# Patient Record
Sex: MALE | Race: WHITE | Employment: FULL TIME | URBAN - METROPOLITAN AREA
[De-identification: names, ages, dates, MRNs, and addresses within clinical notes are randomized per-mention and may not be internally consistent; named-entity substitution may affect disease eponyms.]

---

## 2022-06-06 ENCOUNTER — OFFICE VISIT (OUTPATIENT)
Dept: FAMILY MEDICINE CLINIC | Age: 37
End: 2022-06-06
Payer: COMMERCIAL

## 2022-06-06 VITALS
SYSTOLIC BLOOD PRESSURE: 102 MMHG | HEIGHT: 73 IN | BODY MASS INDEX: 25.71 KG/M2 | HEART RATE: 82 BPM | DIASTOLIC BLOOD PRESSURE: 76 MMHG | OXYGEN SATURATION: 98 % | WEIGHT: 194 LBS

## 2022-06-06 DIAGNOSIS — F90.0 ATTENTION DEFICIT HYPERACTIVITY DISORDER (ADHD), PREDOMINANTLY INATTENTIVE TYPE: Primary | ICD-10-CM

## 2022-06-06 PROBLEM — M25.562 BILATERAL CHRONIC KNEE PAIN: Status: ACTIVE | Noted: 2018-01-12

## 2022-06-06 PROBLEM — M75.102 ROTATOR CUFF SYNDROME, LEFT: Status: ACTIVE | Noted: 2018-01-12

## 2022-06-06 PROBLEM — M76.51 PATELLAR TENDINITIS OF BOTH KNEES: Status: ACTIVE | Noted: 2018-01-12

## 2022-06-06 PROBLEM — F33.2 SEVERE EPISODE OF RECURRENT MAJOR DEPRESSIVE DISORDER, WITHOUT PSYCHOTIC FEATURES (HCC): Status: ACTIVE | Noted: 2018-01-26

## 2022-06-06 PROBLEM — G44.209 TENSION TYPE HEADACHE: Status: ACTIVE | Noted: 2018-01-12

## 2022-06-06 PROBLEM — M25.511 CHRONIC PAIN OF BOTH SHOULDERS: Status: ACTIVE | Noted: 2018-01-12

## 2022-06-06 PROBLEM — G89.29 BILATERAL CHRONIC KNEE PAIN: Status: ACTIVE | Noted: 2018-01-12

## 2022-06-06 PROBLEM — M76.52 PATELLAR TENDINITIS OF BOTH KNEES: Status: ACTIVE | Noted: 2018-01-12

## 2022-06-06 PROBLEM — M25.561 BILATERAL CHRONIC KNEE PAIN: Status: ACTIVE | Noted: 2018-01-12

## 2022-06-06 PROBLEM — G89.29 CHRONIC PAIN OF BOTH SHOULDERS: Status: ACTIVE | Noted: 2018-01-12

## 2022-06-06 PROBLEM — M25.512 CHRONIC PAIN OF BOTH SHOULDERS: Status: ACTIVE | Noted: 2018-01-12

## 2022-06-06 PROBLEM — D56.3 THALASSEMIA MINOR: Status: ACTIVE | Noted: 2018-01-12

## 2022-06-06 PROCEDURE — G8427 DOCREV CUR MEDS BY ELIG CLIN: HCPCS | Performed by: FAMILY MEDICINE

## 2022-06-06 PROCEDURE — 99203 OFFICE O/P NEW LOW 30 MIN: CPT | Performed by: FAMILY MEDICINE

## 2022-06-06 PROCEDURE — G8419 CALC BMI OUT NRM PARAM NOF/U: HCPCS | Performed by: FAMILY MEDICINE

## 2022-06-06 PROCEDURE — 1036F TOBACCO NON-USER: CPT | Performed by: FAMILY MEDICINE

## 2022-06-06 RX ORDER — GUANFACINE 1 MG/1
TABLET, EXTENDED RELEASE ORAL
Qty: 70 TABLET | Refills: 0 | Status: SHIPPED | OUTPATIENT
Start: 2022-06-06

## 2022-06-06 RX ORDER — DEXTROAMPHETAMINE SACCHARATE, AMPHETAMINE ASPARTATE, DEXTROAMPHETAMINE SULFATE AND AMPHETAMINE SULFATE 5; 5; 5; 5 MG/1; MG/1; MG/1; MG/1
20 TABLET ORAL 3 TIMES DAILY
Qty: 90 TABLET | Refills: 0 | Status: SHIPPED | OUTPATIENT
Start: 2022-06-06 | End: 2022-07-11 | Stop reason: SDUPTHER

## 2022-06-06 RX ORDER — DEXTROAMPHETAMINE SACCHARATE, AMPHETAMINE ASPARTATE MONOHYDRATE, DEXTROAMPHETAMINE SULFATE AND AMPHETAMINE SULFATE 2.5; 2.5; 2.5; 2.5 MG/1; MG/1; MG/1; MG/1
10 CAPSULE, EXTENDED RELEASE ORAL 3 TIMES DAILY
COMMUNITY
End: 2022-06-06

## 2022-06-06 SDOH — ECONOMIC STABILITY: FOOD INSECURITY: WITHIN THE PAST 12 MONTHS, YOU WORRIED THAT YOUR FOOD WOULD RUN OUT BEFORE YOU GOT MONEY TO BUY MORE.: NEVER TRUE

## 2022-06-06 SDOH — ECONOMIC STABILITY: FOOD INSECURITY: WITHIN THE PAST 12 MONTHS, THE FOOD YOU BOUGHT JUST DIDN'T LAST AND YOU DIDN'T HAVE MONEY TO GET MORE.: NEVER TRUE

## 2022-06-06 ASSESSMENT — PATIENT HEALTH QUESTIONNAIRE - PHQ9
4. FEELING TIRED OR HAVING LITTLE ENERGY: 3
2. FEELING DOWN, DEPRESSED OR HOPELESS: 3
SUM OF ALL RESPONSES TO PHQ QUESTIONS 1-9: 21
7. TROUBLE CONCENTRATING ON THINGS, SUCH AS READING THE NEWSPAPER OR WATCHING TELEVISION: 3
SUM OF ALL RESPONSES TO PHQ QUESTIONS 1-9: 21
6. FEELING BAD ABOUT YOURSELF - OR THAT YOU ARE A FAILURE OR HAVE LET YOURSELF OR YOUR FAMILY DOWN: 3
9. THOUGHTS THAT YOU WOULD BE BETTER OFF DEAD, OR OF HURTING YOURSELF: 0
SUM OF ALL RESPONSES TO PHQ9 QUESTIONS 1 & 2: 6
8. MOVING OR SPEAKING SO SLOWLY THAT OTHER PEOPLE COULD HAVE NOTICED. OR THE OPPOSITE, BEING SO FIGETY OR RESTLESS THAT YOU HAVE BEEN MOVING AROUND A LOT MORE THAN USUAL: 3
1. LITTLE INTEREST OR PLEASURE IN DOING THINGS: 3
3. TROUBLE FALLING OR STAYING ASLEEP: 2
5. POOR APPETITE OR OVEREATING: 1
SUM OF ALL RESPONSES TO PHQ QUESTIONS 1-9: 21
10. IF YOU CHECKED OFF ANY PROBLEMS, HOW DIFFICULT HAVE THESE PROBLEMS MADE IT FOR YOU TO DO YOUR WORK, TAKE CARE OF THINGS AT HOME, OR GET ALONG WITH OTHER PEOPLE: 2
SUM OF ALL RESPONSES TO PHQ QUESTIONS 1-9: 21

## 2022-06-06 ASSESSMENT — SOCIAL DETERMINANTS OF HEALTH (SDOH): HOW HARD IS IT FOR YOU TO PAY FOR THE VERY BASICS LIKE FOOD, HOUSING, MEDICAL CARE, AND HEATING?: NOT HARD AT ALL

## 2022-06-06 NOTE — PROGRESS NOTES
Cristin Bryant (:  1985) is a 40 y.o. male,New patient, here for evaluation of the following chief complaint(s):  New Patient (Patient needs to establish with new provider, has a couple concerns he would like addressed. Also requesting refill on ADHD medication.)         ASSESSMENT/PLAN:  Ender Colon was seen today for new patient. Diagnoses and all orders for this visit:    Attention deficit hyperactivity disorder (ADHD), predominantly inattentive type  -     amphetamine-dextroamphetamine (ADDERALL, 20MG,) 20 MG tablet; Take 1 tablet by mouth 3 times daily for 30 days. Other orders  -     guanFACINE (INTUNIV) 1 MG TB24 extended release tablet; 1 po qhs for 1 week; 2 po daily for 1 week; 3 po daily for 1 week, then 4 po daily    not well controlled. Increasing adderall and restarting guanfacine  Medication side affects and adverse reactions reviewed. OARRS report pulled and revealed no inconsistencies. No follow-ups on file. Subjective   SUBJECTIVE/OBJECTIVE:  HPI   Pt is a of 40 y.o. male comes in today with   Chief Complaint   Patient presents with    New Patient     Patient needs to establish with new provider, has a couple concerns he would like addressed. Also requesting refill on ADHD medication. Was being seen by doctor in [facility]. Treated in the past for add. Year ago MD had left [facility]. Was on adderall and guafenicin 4 mg. Ran out and had to go on low dose    Chronic joint pain. H/o knee and rotator cuff injuries in the past.  Don't prevent him from working but flares from time to time. Has done PT in the past. Saw ortho a long time ago. Review of Systems     Vitals:    22 1411   BP: 102/76   Site: Left Upper Arm   Position: Sitting   Cuff Size: Medium Adult   Pulse: 82   SpO2: 98%   Weight: 194 lb (88 kg)   Height: 6' 1\" (1.854 m)      Objective   Physical Exam  Constitutional:       Appearance: Normal appearance.    Cardiovascular:      Rate and Rhythm: Normal rate and regular rhythm. Heart sounds: No murmur heard. Pulmonary:      Breath sounds: Normal breath sounds. Neurological:      General: No focal deficit present. Mental Status: He is alert. An electronic signature was used to authenticate this note.     --Eduardo Balbuena MD

## 2022-06-07 ENCOUNTER — TELEPHONE (OUTPATIENT)
Dept: FAMILY MEDICINE CLINIC | Age: 37
End: 2022-06-07

## 2022-06-07 ENCOUNTER — TELEPHONE (OUTPATIENT)
Dept: ADMINISTRATIVE | Age: 37
End: 2022-06-07

## 2022-06-07 NOTE — TELEPHONE ENCOUNTER
Submitted PA for Amphetamine-Dextroamphetamine 20MG tablets Via CMM Key: [id number] STATUS: Rejected. .. Got a call from Dora from [employer]. Per rep, they have patient's  as 1989 but we have 1985. Patient needs to contact insurance to get fix in order for PA to go through. Please notify patient.  Thank you

## 2022-06-07 NOTE — TELEPHONE ENCOUNTER
Submitted PA for guanFACINE HCl ER 1MG er tablets Via CMM Key: [id number] STATUS: Rejected. .. Got a call from Dora from [employer]. Per rep, they have patient's  as 1989 but we have 1985. Patient needs to contact insurance to get fix in order for PA to go through. Please notify patient.  Thank you

## 2022-06-07 NOTE — TELEPHONE ENCOUNTER
Medication  guanFACINE (INTUNIV) 1 MG TB24 extended release tablet [[id number]]   Dose, Route, Frequency: As Directed   Dispense Quantity: 70 tablet Refills: 0          Si po qhs for 1 week; 2 po daily for 1 week; 3 po daily for 1 week, then 4 po daily         Start Date: 22 End Date: --   Written Date: 22 Expiration Date: 23     Providers  Authorizing Provider: Adele Soriano MD NPI: [id number]   Ordering User:  Adele Soriano MD          Pharmacy  [id number], [address], [place]   [address] [place] [address]   Phone:  [phone]  Fax:  [phone]     F90.0 Attention deficit hyperactivity disorder (ADHD), predominantly inattentive type

## 2022-06-07 NOTE — TELEPHONE ENCOUNTER
Medication  amphetamine-dextroamphetamine (ADDERALL, 20MG,) 20 MG tablet [[id number]]   Dose: 20 mg Route: Oral Frequency: 3 TIMES DAILY   Dispense Quantity: 90 tablet Refills: 0          Sig: Take 1 tablet by mouth 3 times daily for 30 days.         Start Date: 06/06/22 End Date: 07/06/22 after 90 doses   Written Date: 06/06/22 Expiration Date: 08/05/22   Earliest Fill Date: 06/06/22         Diagnosis Association: Attention deficit hyperactivity disorder (ADHD), predominantly inattentive type (F90.0)     Providers  Authorizing Provider: Morteza Adamson MD NPI: [id number]   Ordering User:  Morteza Adamson MD          Pharmacy  [id number] - P [phone] - F [phone]   [address] [place] [address]   Phone:  [phone]  Fax:  [phone]

## 2022-06-10 NOTE — TELEPHONE ENCOUNTER
Submitted PA for guanFACINE HCl ER 1MG er tablets Via CMM Key: [id number] STATUS: APPROVED effective 06/10/2022 - 09/10/2022    Please notify patient.  Thank you

## 2022-06-10 NOTE — TELEPHONE ENCOUNTER
Submitted PA for Amphetamine-Dextroamphetamine 20MG tablets Via [facility] Key: [id number] STATUS: APPROVED effective 06/10/2022 - 06/10/2023     Please notify patient.  Thank you

## 2022-06-10 NOTE — TELEPHONE ENCOUNTER
Addended by: ROXIE DUMAS on: 10/15/2021 01:05 PM     Modules accepted: Orders     Patient is calling in stating that he called the insurance company and would like this PA to be re-submitted

## 2022-06-10 NOTE — TELEPHONE ENCOUNTER
Patient is calling in stating that he called the insurance company and would like this PA to be re-submitted.

## 2022-06-11 PROBLEM — F90.0 ATTENTION DEFICIT HYPERACTIVITY DISORDER (ADHD), PREDOMINANTLY INATTENTIVE TYPE: Status: ACTIVE | Noted: 2022-06-11

## 2022-07-11 DIAGNOSIS — F90.0 ATTENTION DEFICIT HYPERACTIVITY DISORDER (ADHD), PREDOMINANTLY INATTENTIVE TYPE: ICD-10-CM

## 2022-07-11 RX ORDER — DEXTROAMPHETAMINE SACCHARATE, AMPHETAMINE ASPARTATE, DEXTROAMPHETAMINE SULFATE AND AMPHETAMINE SULFATE 5; 5; 5; 5 MG/1; MG/1; MG/1; MG/1
20 TABLET ORAL 3 TIMES DAILY
Qty: 90 TABLET | Refills: 0 | Status: SHIPPED | OUTPATIENT
Start: 2022-07-11 | End: 2022-08-11 | Stop reason: SDUPTHER

## 2022-07-11 RX ORDER — GUANFACINE 3 MG/1
3 TABLET, EXTENDED RELEASE ORAL NIGHTLY
Qty: 30 TABLET | Refills: 2 | Status: SHIPPED | OUTPATIENT
Start: 2022-07-11 | End: 2022-09-13 | Stop reason: SDUPTHER

## 2022-07-11 RX ORDER — GUANFACINE 1 MG/1
3 TABLET, EXTENDED RELEASE ORAL DAILY
Qty: 90 TABLET | Refills: 0 | OUTPATIENT
Start: 2022-07-11

## 2022-07-11 NOTE — TELEPHONE ENCOUNTER
Adderall - 6/6/22    Patient states that the 3 MG tablet of Intuniv works best, wants to know if refills can be put on this?

## 2022-07-11 NOTE — TELEPHONE ENCOUNTER
Medication:   Requested Prescriptions     Pending Prescriptions Disp Refills    guanFACINE (INTUNIV) 1 MG TB24 extended release tablet [Pharmacy Med Name: guanFACINE HCl ER Oral Tablet Extended Release 24 Hour 1 MG] 70 tablet 0     Sig: TAKE 1 TABLET BY MOUTH AT BEDTIME X 7 DAYS, THEN 2 DAILY X 7 DAYS, THEN 3 DAILY X 7 DAYS, THEN 4 DAILY        Last Filled: 6/6/2022   Last appt: 6/6/2022   Next appt: none

## 2022-08-10 DIAGNOSIS — F90.0 ATTENTION DEFICIT HYPERACTIVITY DISORDER (ADHD), PREDOMINANTLY INATTENTIVE TYPE: ICD-10-CM

## 2022-08-10 NOTE — TELEPHONE ENCOUNTER
Medication:   Requested Prescriptions     Pending Prescriptions Disp Refills    amphetamine-dextroamphetamine (ADDERALL, 20MG,) 20 MG tablet 90 tablet 0     Sig: Take 1 tablet by mouth in the morning and 1 tablet at noon and 1 tablet before bedtime. Do all this for 30 days. Last Filled:  7/11/22    Patient Phone Number: [phone] (home)     Last appt: 6/6/2022   Next appt: Visit date not found    Last OARRS: No flowsheet data found.

## 2022-08-11 RX ORDER — DEXTROAMPHETAMINE SACCHARATE, AMPHETAMINE ASPARTATE, DEXTROAMPHETAMINE SULFATE AND AMPHETAMINE SULFATE 5; 5; 5; 5 MG/1; MG/1; MG/1; MG/1
20 TABLET ORAL 3 TIMES DAILY
Qty: 90 TABLET | Refills: 0 | Status: SHIPPED | OUTPATIENT
Start: 2022-08-11 | End: 2022-10-17 | Stop reason: SDUPTHER

## 2022-09-12 NOTE — TELEPHONE ENCOUNTER
----- Message from Bobby Sotelo sent at 9/12/2022  2:49 PM EDT -----  Subject: Refill Request    QUESTIONS  Name of Medication? guanFACINE HCl ER (INTUNIV) 3 MG TB24 tablet  Patient-reported dosage and instructions? Take 1 tablet by mouth at   bedtime  How many days do you have left? 4  Preferred Pharmacy? [address]  Pharmacy phone number (if available)? [phone]  ---------------------------------------------------------------------------  --------------,  Name of Medication? amphetamine-dextroamphetamine (ADDERALL, 20MG,) 20 MG   tablet  Patient-reported dosage and instructions? Take 1 tablet by mouth in the   morning and 1 tablet at noon and 1 tablet before bedtime. Do all this for   30 days. How many days do you have left? 10  Preferred Pharmacy? [address]  Pharmacy phone number (if available)? [phone]  ---------------------------------------------------------------------------  --------------  Nic Urrutiagoldie STEWART  What is the best way for the office to contact you? OK to leave message on   voicemail  Preferred Call Back Phone Number? [phone]  ---------------------------------------------------------------------------  --------------  SCRIPT ANSWERS  Relationship to Patient?  Self

## 2022-09-12 NOTE — TELEPHONE ENCOUNTER
Medication:   Requested Prescriptions     Pending Prescriptions Disp Refills    guanFACINE HCl ER (INTUNIV) 3 MG TB24 tablet 30 tablet 2     Sig: Take 1 tablet by mouth at bedtime        Last Filled:  7/11/22 30 tablets 2 refills    Patient Phone Number: [phone] (home)     Last appt: 6/6/2022   Next appt: Visit date not found    Last OARRS: No flowsheet data found.

## 2022-09-13 RX ORDER — GUANFACINE 3 MG/1
3 TABLET, EXTENDED RELEASE ORAL NIGHTLY
Qty: 30 TABLET | Refills: 2 | Status: SHIPPED | OUTPATIENT
Start: 2022-09-13

## 2022-10-17 DIAGNOSIS — F90.0 ATTENTION DEFICIT HYPERACTIVITY DISORDER (ADHD), PREDOMINANTLY INATTENTIVE TYPE: ICD-10-CM

## 2022-10-17 RX ORDER — DEXTROAMPHETAMINE SACCHARATE, AMPHETAMINE ASPARTATE, DEXTROAMPHETAMINE SULFATE AND AMPHETAMINE SULFATE 5; 5; 5; 5 MG/1; MG/1; MG/1; MG/1
20 TABLET ORAL 3 TIMES DAILY
Qty: 90 TABLET | Refills: 0 | Status: SHIPPED | OUTPATIENT
Start: 2022-10-17 | End: 2022-11-16

## 2022-10-17 NOTE — TELEPHONE ENCOUNTER
Medication:   Requested Prescriptions     Pending Prescriptions Disp Refills    amphetamine-dextroamphetamine (ADDERALL, 20MG,) 20 MG tablet 90 tablet 0     Sig: Take 1 tablet by mouth 3 times daily for 30 days. Last Filled: 8/11/2022   Last appt: 6/6/2022   Return in about 3 months (around 9/6/2022).   Next appt: none

## 2022-10-17 NOTE — TELEPHONE ENCOUNTER
----- Message from Ralf Vick sent at 10/13/2022  4:08 PM EDT -----  Subject: Refill Request    QUESTIONS  Name of Medication? amphetamine-dextroamphetamine (ADDERALL, 20MG,) 20 MG tablet  Patient-reported dosage and instructions? 1 DAILY  How many days do you have left? 0  Preferred Pharmacy? [address]  Pharmacy phone number (if available)? [phone]  -----------------------------------------------------------------------------------------  Steve Zaidi INFO  What is the best way for the office to contact you? OK to leave message on voicemail  Preferred Call Back Phone Number? [phone]  -----------------------------------------------------------------------------------------  SCRIPT ANSWERS  Relationship to Patient?  Self

## 2022-12-19 DIAGNOSIS — F90.0 ATTENTION DEFICIT HYPERACTIVITY DISORDER (ADHD), PREDOMINANTLY INATTENTIVE TYPE: ICD-10-CM

## 2022-12-19 NOTE — TELEPHONE ENCOUNTER
Quanfacine 3 mg  Adderall 20 mg  Obed Ledezma- [address] [facility] [address]    Patient called in stating he forgot to call on Friday to get his medication refill request put in. He states he is now out of medication and would like a rush put on this to his pharmacy. I did inform the patient that it may take 24-48 hours for this to get taken care of. The patient's last OV was on 06/06/2022, he was informed that since he needs to be seen every 6 months they may require an appointment. The patient did schedule a medication check with his first available on 01/03/2023. Please let the patient know if this can be filled.

## 2022-12-19 NOTE — TELEPHONE ENCOUNTER
Medication:   Requested Prescriptions     Pending Prescriptions Disp Refills    amphetamine-dextroamphetamine (ADDERALL, 20MG,) 20 MG tablet 90 tablet 0     Sig: Take 1 tablet by mouth 3 times daily for 30 days. guanFACINE (INTUNIV) 1 MG TB24 extended release tablet 70 tablet 0     Si po qhs for 1 week; 2 po daily for 1 week; 3 po daily for 1 week, then 4 po daily        Last Filled: Adderall 10/17/22 Intuniv 22    Patient Phone Number: [phone] (home)     Last appt: 2022   Next appt: 1/3/2023    Last OARRS: No flowsheet data found.

## 2022-12-20 RX ORDER — GUANFACINE 1 MG/1
TABLET, EXTENDED RELEASE ORAL
Qty: 70 TABLET | Refills: 0 | Status: SHIPPED | OUTPATIENT
Start: 2022-12-20 | End: 2022-12-23

## 2022-12-20 RX ORDER — DEXTROAMPHETAMINE SACCHARATE, AMPHETAMINE ASPARTATE, DEXTROAMPHETAMINE SULFATE AND AMPHETAMINE SULFATE 5; 5; 5; 5 MG/1; MG/1; MG/1; MG/1
20 TABLET ORAL 3 TIMES DAILY
Qty: 90 TABLET | Refills: 0 | Status: SHIPPED | OUTPATIENT
Start: 2022-12-20 | End: 2023-01-19

## 2022-12-20 NOTE — TELEPHONE ENCOUNTER
Patient is calling in and would like to know if this can be filled today. The patient stated he is out and needs to take a dose this afternoon. He was advised medication refills take 24-48 hours to be completed. Please advise patient.

## 2022-12-22 ENCOUNTER — TELEPHONE (OUTPATIENT)
Dept: FAMILY MEDICINE CLINIC | Age: 37
End: 2022-12-22

## 2022-12-22 NOTE — TELEPHONE ENCOUNTER
Please contact [address] [facility]. regarding clarification needed for the pt's Guanfacine Rx. Pt's directions state to titrate up but pt states he's already taking the 3 mg dose.

## 2022-12-23 RX ORDER — GUANFACINE 3 MG/1
3 TABLET, EXTENDED RELEASE ORAL NIGHTLY
Qty: 30 TABLET | Refills: 2 | Status: SHIPPED | OUTPATIENT
Start: 2022-12-23

## 2023-01-03 ENCOUNTER — OFFICE VISIT (OUTPATIENT)
Dept: FAMILY MEDICINE CLINIC | Age: 38
End: 2023-01-03
Payer: COMMERCIAL

## 2023-01-03 VITALS
HEART RATE: 67 BPM | OXYGEN SATURATION: 97 % | SYSTOLIC BLOOD PRESSURE: 118 MMHG | WEIGHT: 196 LBS | BODY MASS INDEX: 25.98 KG/M2 | HEIGHT: 73 IN | DIASTOLIC BLOOD PRESSURE: 78 MMHG

## 2023-01-03 DIAGNOSIS — F90.0 ATTENTION DEFICIT HYPERACTIVITY DISORDER (ADHD), PREDOMINANTLY INATTENTIVE TYPE: ICD-10-CM

## 2023-01-03 PROCEDURE — 1036F TOBACCO NON-USER: CPT | Performed by: FAMILY MEDICINE

## 2023-01-03 PROCEDURE — G8427 DOCREV CUR MEDS BY ELIG CLIN: HCPCS | Performed by: FAMILY MEDICINE

## 2023-01-03 PROCEDURE — G8484 FLU IMMUNIZE NO ADMIN: HCPCS | Performed by: FAMILY MEDICINE

## 2023-01-03 PROCEDURE — G8419 CALC BMI OUT NRM PARAM NOF/U: HCPCS | Performed by: FAMILY MEDICINE

## 2023-01-03 PROCEDURE — 99212 OFFICE O/P EST SF 10 MIN: CPT | Performed by: FAMILY MEDICINE

## 2023-01-03 RX ORDER — DEXTROAMPHETAMINE SACCHARATE, AMPHETAMINE ASPARTATE, DEXTROAMPHETAMINE SULFATE AND AMPHETAMINE SULFATE 5; 5; 5; 5 MG/1; MG/1; MG/1; MG/1
20 TABLET ORAL 3 TIMES DAILY
Qty: 90 TABLET | Refills: 0 | Status: SHIPPED | OUTPATIENT
Start: 2023-01-03 | End: 2023-02-02

## 2023-01-03 NOTE — PROGRESS NOTES
Robert Bush (:  1985) is a 40 y.o. male,Established patient, here for evaluation of the following chief complaint(s):  Medication Management         ASSESSMENT/PLAN:  Emmanuel Voss was seen today for medication management. Diagnoses and all orders for this visit:    Attention deficit hyperactivity disorder (ADHD), predominantly inattentive type  -     amphetamine-dextroamphetamine (ADDERALL, 20MG,) 20 MG tablet; Take 1 tablet by mouth 3 times daily for 30 days. The current medical regimen is effective;  continue present plan and medications. No follow-ups on file. Subjective   SUBJECTIVE/OBJECTIVE:  HPI  Pt is a of 40 y.o. male comes in today with   Chief Complaint   Patient presents with    Medication Management     Right ribs floating-attributed to old injury  With compression gets a sharp pain. Relieved with exertion    Add stable on guanfenicin and adderall. Vitals:    23 1429   BP: 118/78   Site: Left Upper Arm   Position: Sitting   Cuff Size: Medium Adult   Pulse: 67   SpO2: 97%   Weight: 196 lb (88.9 kg)   Height: 6' 1\" (1.854 m)      Review of Systems       Objective   Physical Exam         An electronic signature was used to authenticate this note.     --Ceci Shea MD

## 2023-05-01 DIAGNOSIS — F90.0 ATTENTION DEFICIT HYPERACTIVITY DISORDER (ADHD), PREDOMINANTLY INATTENTIVE TYPE: ICD-10-CM

## 2023-05-01 NOTE — TELEPHONE ENCOUNTER
Medication:   Requested Prescriptions     Pending Prescriptions Disp Refills    amphetamine-dextroamphetamine (ADDERALL, 20MG,) 20 MG tablet 90 tablet 0     Sig: Take 1 tablet by mouth 3 times daily for 30 days. guanFACINE HCl ER (INTUNIV) 3 MG TB24 tablet 30 tablet 2     Sig: Take 1 tablet by mouth at bedtime        Last Filled:  Adderall (1/3/23) + Intuniv (12/23/22)  Last appt: 1/3/2023   Next appt: none

## 2023-05-03 RX ORDER — GUANFACINE 3 MG/1
3 TABLET, EXTENDED RELEASE ORAL NIGHTLY
Qty: 30 TABLET | Refills: 2 | Status: SHIPPED | OUTPATIENT
Start: 2023-05-03

## 2023-05-03 RX ORDER — DEXTROAMPHETAMINE SACCHARATE, AMPHETAMINE ASPARTATE, DEXTROAMPHETAMINE SULFATE AND AMPHETAMINE SULFATE 5; 5; 5; 5 MG/1; MG/1; MG/1; MG/1
20 TABLET ORAL 3 TIMES DAILY
Qty: 90 TABLET | Refills: 0 | Status: SHIPPED | OUTPATIENT
Start: 2023-05-03 | End: 2023-06-02

## 2023-06-27 RX ORDER — GUANFACINE 3 MG/1
3 TABLET, EXTENDED RELEASE ORAL NIGHTLY
Qty: 30 TABLET | Refills: 5 | Status: SHIPPED | OUTPATIENT
Start: 2023-06-27

## 2023-06-27 NOTE — TELEPHONE ENCOUNTER
Medication:   Requested Prescriptions     Pending Prescriptions Disp Refills    guanFACINE HCl ER (INTUNIV) 3 MG TB24 tablet [Pharmacy Med Name: guanFACINE HCl ER Oral Tablet Extended Release 24 Hour 3 MG] 30 tablet 0     Sig: TAKE 1 TABLET BY MOUTH AT BEDTIME        Last Filled: 5/3/23   Last appt: 1/3/2023   Next appt: none